# Patient Record
(demographics unavailable — no encounter records)

---

## 2025-02-24 NOTE — HISTORY OF PRESENT ILLNESS
[Y] : Patient is sexually active [PapSmeardate] : >5 yrs [FreeTextEntry1] : will start birth control